# Patient Record
Sex: FEMALE | Race: WHITE
[De-identification: names, ages, dates, MRNs, and addresses within clinical notes are randomized per-mention and may not be internally consistent; named-entity substitution may affect disease eponyms.]

---

## 2021-06-15 ENCOUNTER — HOSPITAL ENCOUNTER (OUTPATIENT)
Dept: HOSPITAL 74 - JASU-ENDO | Age: 61
Discharge: HOME | End: 2021-06-15
Attending: INTERNAL MEDICINE
Payer: COMMERCIAL

## 2021-06-15 VITALS — HEART RATE: 63 BPM | SYSTOLIC BLOOD PRESSURE: 137 MMHG | DIASTOLIC BLOOD PRESSURE: 68 MMHG

## 2021-06-15 VITALS — BODY MASS INDEX: 29.2 KG/M2

## 2021-06-15 VITALS — TEMPERATURE: 97.6 F

## 2021-06-15 DIAGNOSIS — E11.9: ICD-10-CM

## 2021-06-15 DIAGNOSIS — Z12.11: Primary | ICD-10-CM

## 2021-06-15 DIAGNOSIS — Z86.010: ICD-10-CM

## 2021-06-15 DIAGNOSIS — K63.89: ICD-10-CM

## 2021-06-15 PROCEDURE — 0DJD8ZZ INSPECTION OF LOWER INTESTINAL TRACT, VIA NATURAL OR ARTIFICIAL OPENING ENDOSCOPIC: ICD-10-PCS | Performed by: INTERNAL MEDICINE

## 2021-06-22 ENCOUNTER — HOSPITAL ENCOUNTER (OUTPATIENT)
Dept: HOSPITAL 74 - JASU-ENDO | Age: 61
Discharge: HOME | End: 2021-06-22
Attending: INTERNAL MEDICINE
Payer: COMMERCIAL

## 2021-06-22 VITALS — DIASTOLIC BLOOD PRESSURE: 76 MMHG | SYSTOLIC BLOOD PRESSURE: 148 MMHG

## 2021-06-22 VITALS — HEART RATE: 67 BPM

## 2021-06-22 VITALS — TEMPERATURE: 98.6 F

## 2021-06-22 VITALS — BODY MASS INDEX: 28.8 KG/M2

## 2021-06-22 DIAGNOSIS — R03.0: ICD-10-CM

## 2021-06-22 DIAGNOSIS — Z53.09: ICD-10-CM

## 2021-06-22 DIAGNOSIS — R68.83: ICD-10-CM

## 2021-06-22 DIAGNOSIS — R10.13: Primary | ICD-10-CM

## 2021-06-22 PROCEDURE — 0DJ08ZZ INSPECTION OF UPPER INTESTINAL TRACT, VIA NATURAL OR ARTIFICIAL OPENING ENDOSCOPIC: ICD-10-PCS | Performed by: INTERNAL MEDICINE

## 2022-03-07 ENCOUNTER — RESULT REVIEW (OUTPATIENT)
Age: 62
End: 2022-03-07

## 2022-06-28 PROBLEM — Z00.00 ENCOUNTER FOR PREVENTIVE HEALTH EXAMINATION: Status: ACTIVE | Noted: 2022-06-28

## 2022-06-29 ENCOUNTER — APPOINTMENT (OUTPATIENT)
Dept: HEART AND VASCULAR | Facility: CLINIC | Age: 62
End: 2022-06-29

## 2022-06-29 PROCEDURE — 99202 OFFICE O/P NEW SF 15 MIN: CPT

## 2022-07-20 NOTE — HISTORY OF PRESENT ILLNESS
[FreeTextEntry1] : 62F, hx of HTN, dyslipidemia, here for routine evaluation. Denies chest pain, sob, palpitations, fatigue or loc. Moderate activity. No sig family hx of heart disease. Nonsmoker.

## 2022-07-20 NOTE — DISCUSSION/SUMMARY
[FreeTextEntry1] : 62F with risk factors for CAD here to establish care\par \par --health maintenance performed\par --advised on diet and exercise regimen\par --pt understands that if she develops cp or sob especially with activity to come for a visit\par --f/u with PMD

## 2022-12-14 ENCOUNTER — APPOINTMENT (OUTPATIENT)
Dept: HEART AND VASCULAR | Facility: CLINIC | Age: 62
End: 2022-12-14